# Patient Record
Sex: FEMALE | URBAN - METROPOLITAN AREA
[De-identification: names, ages, dates, MRNs, and addresses within clinical notes are randomized per-mention and may not be internally consistent; named-entity substitution may affect disease eponyms.]

---

## 2021-04-28 ENCOUNTER — TELEPHONE (OUTPATIENT)
Dept: TRANSPLANT | Facility: CLINIC | Age: 36
End: 2021-04-28

## 2021-04-28 NOTE — TELEPHONE ENCOUNTER
Patient Call: mother Zainab called and was wondering if the [facility] has any new techniques regarding vascular access?    Zainab's # [phone], Gena's #[phone]    Call back needed? Yes    Return Call Needed  Same as documented in contacts section  When to return call?: Greater than one day: Route standard priority

## 2021-04-29 NOTE — TELEPHONE ENCOUNTER
Returned call to Zainab, patient's mother, who was inquiring about new vascular access options. Pt has been on dialysis on and off for 30 years. 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 According to Zainab she currently has access in her abdomen. They've had vein mapping done at [facility] and were told there's no other viable places for long term access. She was wondering about growing stem cell tissue, but I informed her unfortunately that's not something that is being done in practice anywhere at this time. She said she calls every year to see if there's anything new and she will try back next year.